# Patient Record
Sex: MALE | Race: WHITE | NOT HISPANIC OR LATINO | URBAN - METROPOLITAN AREA
[De-identification: names, ages, dates, MRNs, and addresses within clinical notes are randomized per-mention and may not be internally consistent; named-entity substitution may affect disease eponyms.]

---

## 2019-04-16 ENCOUNTER — INPATIENT (INPATIENT)
Facility: HOSPITAL | Age: 54
LOS: 0 days | Discharge: HOME | End: 2019-04-17
Attending: HOSPITALIST | Admitting: HOSPITALIST
Payer: COMMERCIAL

## 2019-04-16 VITALS
HEART RATE: 70 BPM | SYSTOLIC BLOOD PRESSURE: 169 MMHG | TEMPERATURE: 96 F | RESPIRATION RATE: 20 BRPM | OXYGEN SATURATION: 97 % | DIASTOLIC BLOOD PRESSURE: 98 MMHG

## 2019-04-16 LAB
ALBUMIN SERPL ELPH-MCNC: 4.6 G/DL — SIGNIFICANT CHANGE UP (ref 3.5–5.2)
ALP SERPL-CCNC: 63 U/L — SIGNIFICANT CHANGE UP (ref 30–115)
ALT FLD-CCNC: 22 U/L — SIGNIFICANT CHANGE UP (ref 0–41)
ANION GAP SERPL CALC-SCNC: 12 MMOL/L — SIGNIFICANT CHANGE UP (ref 7–14)
AST SERPL-CCNC: 18 U/L — SIGNIFICANT CHANGE UP (ref 0–41)
BASOPHILS # BLD AUTO: 0.05 K/UL — SIGNIFICANT CHANGE UP (ref 0–0.2)
BASOPHILS NFR BLD AUTO: 0.4 % — SIGNIFICANT CHANGE UP (ref 0–1)
BILIRUB SERPL-MCNC: 0.5 MG/DL — SIGNIFICANT CHANGE UP (ref 0.2–1.2)
BUN SERPL-MCNC: 13 MG/DL — SIGNIFICANT CHANGE UP (ref 10–20)
CALCIUM SERPL-MCNC: 9.8 MG/DL — SIGNIFICANT CHANGE UP (ref 8.5–10.1)
CHLORIDE SERPL-SCNC: 102 MMOL/L — SIGNIFICANT CHANGE UP (ref 98–110)
CO2 SERPL-SCNC: 27 MMOL/L — SIGNIFICANT CHANGE UP (ref 17–32)
CREAT SERPL-MCNC: 1 MG/DL — SIGNIFICANT CHANGE UP (ref 0.7–1.5)
EOSINOPHIL # BLD AUTO: 0.14 K/UL — SIGNIFICANT CHANGE UP (ref 0–0.7)
EOSINOPHIL NFR BLD AUTO: 1.2 % — SIGNIFICANT CHANGE UP (ref 0–8)
GLUCOSE SERPL-MCNC: 118 MG/DL — HIGH (ref 70–99)
HCT VFR BLD CALC: 49.9 % — SIGNIFICANT CHANGE UP (ref 42–52)
HGB BLD-MCNC: 17 G/DL — SIGNIFICANT CHANGE UP (ref 14–18)
IMM GRANULOCYTES NFR BLD AUTO: 0.8 % — HIGH (ref 0.1–0.3)
LACTATE SERPL-SCNC: 0.9 MMOL/L — SIGNIFICANT CHANGE UP (ref 0.5–2.2)
LYMPHOCYTES # BLD AUTO: 1.27 K/UL — SIGNIFICANT CHANGE UP (ref 1.2–3.4)
LYMPHOCYTES # BLD AUTO: 10.7 % — LOW (ref 20.5–51.1)
MCHC RBC-ENTMCNC: 30.5 PG — SIGNIFICANT CHANGE UP (ref 27–31)
MCHC RBC-ENTMCNC: 34.1 G/DL — SIGNIFICANT CHANGE UP (ref 32–37)
MCV RBC AUTO: 89.6 FL — SIGNIFICANT CHANGE UP (ref 80–94)
MONOCYTES # BLD AUTO: 1.16 K/UL — HIGH (ref 0.1–0.6)
MONOCYTES NFR BLD AUTO: 9.8 % — HIGH (ref 1.7–9.3)
NEUTROPHILS # BLD AUTO: 9.12 K/UL — HIGH (ref 1.4–6.5)
NEUTROPHILS NFR BLD AUTO: 77.1 % — HIGH (ref 42.2–75.2)
NRBC # BLD: 0 /100 WBCS — SIGNIFICANT CHANGE UP (ref 0–0)
PLATELET # BLD AUTO: 254 K/UL — SIGNIFICANT CHANGE UP (ref 130–400)
POTASSIUM SERPL-MCNC: 4.4 MMOL/L — SIGNIFICANT CHANGE UP (ref 3.5–5)
POTASSIUM SERPL-SCNC: 4.4 MMOL/L — SIGNIFICANT CHANGE UP (ref 3.5–5)
PROT SERPL-MCNC: 7.8 G/DL — SIGNIFICANT CHANGE UP (ref 6–8)
RBC # BLD: 5.57 M/UL — SIGNIFICANT CHANGE UP (ref 4.7–6.1)
RBC # FLD: 13 % — SIGNIFICANT CHANGE UP (ref 11.5–14.5)
SODIUM SERPL-SCNC: 141 MMOL/L — SIGNIFICANT CHANGE UP (ref 135–146)
WBC # BLD: 11.83 K/UL — HIGH (ref 4.8–10.8)
WBC # FLD AUTO: 11.83 K/UL — HIGH (ref 4.8–10.8)

## 2019-04-16 PROCEDURE — 74177 CT ABD & PELVIS W/CONTRAST: CPT | Mod: 26

## 2019-04-16 PROCEDURE — 99285 EMERGENCY DEPT VISIT HI MDM: CPT | Mod: 25

## 2019-04-16 RX ORDER — ACETAMINOPHEN 500 MG
650 TABLET ORAL EVERY 6 HOURS
Qty: 0 | Refills: 0 | Status: DISCONTINUED | OUTPATIENT
Start: 2019-04-16 | End: 2019-04-17

## 2019-04-16 RX ORDER — SODIUM CHLORIDE 9 MG/ML
1000 INJECTION INTRAMUSCULAR; INTRAVENOUS; SUBCUTANEOUS
Qty: 0 | Refills: 0 | Status: DISCONTINUED | OUTPATIENT
Start: 2019-04-16 | End: 2019-04-17

## 2019-04-16 RX ORDER — METRONIDAZOLE 500 MG
500 TABLET ORAL ONCE
Qty: 0 | Refills: 0 | Status: COMPLETED | OUTPATIENT
Start: 2019-04-16 | End: 2019-04-16

## 2019-04-16 RX ORDER — SODIUM CHLORIDE 9 MG/ML
1000 INJECTION, SOLUTION INTRAVENOUS ONCE
Qty: 0 | Refills: 0 | Status: COMPLETED | OUTPATIENT
Start: 2019-04-16 | End: 2019-04-16

## 2019-04-16 RX ORDER — METRONIDAZOLE 500 MG
500 TABLET ORAL EVERY 8 HOURS
Qty: 0 | Refills: 0 | Status: DISCONTINUED | OUTPATIENT
Start: 2019-04-16 | End: 2019-04-17

## 2019-04-16 RX ORDER — LABETALOL HCL 100 MG
200 TABLET ORAL ONCE
Qty: 0 | Refills: 0 | Status: COMPLETED | OUTPATIENT
Start: 2019-04-16 | End: 2019-04-16

## 2019-04-16 RX ORDER — CIPROFLOXACIN LACTATE 400MG/40ML
400 VIAL (ML) INTRAVENOUS EVERY 12 HOURS
Qty: 0 | Refills: 0 | Status: DISCONTINUED | OUTPATIENT
Start: 2019-04-16 | End: 2019-04-17

## 2019-04-16 RX ORDER — KETOROLAC TROMETHAMINE 30 MG/ML
15 SYRINGE (ML) INJECTION ONCE
Qty: 0 | Refills: 0 | Status: DISCONTINUED | OUTPATIENT
Start: 2019-04-16 | End: 2019-04-16

## 2019-04-16 RX ORDER — CHLORHEXIDINE GLUCONATE 213 G/1000ML
1 SOLUTION TOPICAL
Qty: 0 | Refills: 0 | Status: DISCONTINUED | OUTPATIENT
Start: 2019-04-16 | End: 2019-04-17

## 2019-04-16 RX ORDER — TRAMADOL HYDROCHLORIDE 50 MG/1
50 TABLET ORAL EVERY 4 HOURS
Qty: 0 | Refills: 0 | Status: DISCONTINUED | OUTPATIENT
Start: 2019-04-16 | End: 2019-04-17

## 2019-04-16 RX ADMIN — Medication 200 MILLIGRAM(S): at 22:04

## 2019-04-16 RX ADMIN — TRAMADOL HYDROCHLORIDE 50 MILLIGRAM(S): 50 TABLET ORAL at 22:48

## 2019-04-16 RX ADMIN — TRAMADOL HYDROCHLORIDE 50 MILLIGRAM(S): 50 TABLET ORAL at 22:00

## 2019-04-16 RX ADMIN — Medication 100 MILLIGRAM(S): at 21:05

## 2019-04-16 RX ADMIN — Medication 200 MILLIGRAM(S): at 13:51

## 2019-04-16 RX ADMIN — Medication 15 MILLIGRAM(S): at 08:02

## 2019-04-16 RX ADMIN — Medication 100 MILLIGRAM(S): at 12:58

## 2019-04-16 RX ADMIN — SODIUM CHLORIDE 2000 MILLILITER(S): 9 INJECTION, SOLUTION INTRAVENOUS at 08:02

## 2019-04-16 RX ADMIN — Medication 15 MILLIGRAM(S): at 17:10

## 2019-04-16 RX ADMIN — SODIUM CHLORIDE 75 MILLILITER(S): 9 INJECTION INTRAMUSCULAR; INTRAVENOUS; SUBCUTANEOUS at 17:10

## 2019-04-16 NOTE — H&P ADULT - NSHPLABSRESULTS_GEN_ALL_CORE
17.0   11.83 )-----------( 254      ( 16 Apr 2019 07:17 )             49.9       04-16    141  |  102  |  13  ----------------------------<  118<H>  4.4   |  27  |  1.0    Ca    9.8      16 Apr 2019 07:17    TPro  7.8  /  Alb  4.6  /  TBili  0.5  /  DBili  x   /  AST  18  /  ALT  22  /  AlkPhos  63  04-16    < from: CT Abdomen and Pelvis w/ IV Cont (04.16.19 @ 09:21) >    EXAM:  CT ABDOMEN AND PELVIS IC            PROCEDURE DATE:  04/16/2019            INTERPRETATION:  CLINICAL STATEMENT: Left lower quadrant pain.      TECHNIQUE: Contiguous axial CT images were obtained from the lower chest   to the pubic symphysis following administration of 100cc Optiray 320   intravenous contrast.  Oral contrast was not administered.  Reformatted   images in the coronal and sagittal planes were acquired.    COMPARISON CT: 1/21/2016    OTHER STUDIES USED FOR CORRELATION: None.      FINDINGS:    LOWER CHEST: Unremarkable.    HEPATOBILIARY: Unremarkable.    SPLEEN: Unremarkable.    PANCREAS: Unremarkable.    ADRENAL GLANDS: Unremarkable.    KIDNEYS: Unremarkable.    ABDOMINOPELVIC NODES: Unremarkable.    PELVIC ORGANS: Diffuse urinary bladder wall thickening.    PERITONEUM/MESENTERY/BOWEL: Acute inflammation and phlegmon surrounding a   lower descending colonic diverticulum. No abscess. No free air. Normal   appendix. No bowel obstruction.    BONES/SOFT TISSUES: Degenerative changes of the hips and symphysis pubis.   No acute abnormalities.    OTHER:        IMPRESSION:     Acute lower descending colonic diverticulitis with surrounding phlegmon.   No abscess. No evidence of perforation< from: CT Abdomen and Pelvis w/ IV Cont (04.16.19 @ 09:21) > 17.0   11.83 )-----------( 254      ( 16 Apr 2019 07:17 )             49.9       04-16    141  |  102  |  13  ----------------------------<  118<H>  4.4   |  27  |  1.0    Ca    9.8      16 Apr 2019 07:17    TPro  7.8  /  Alb  4.6  /  TBili  0.5  /  DBili  x   /  AST  18  /  ALT  22  /  AlkPhos  63  04-16    < from: CT Abdomen and Pelvis w/ IV Cont (04.16.19 @ 09:21) >    EXAM:  CT ABDOMEN AND PELVIS IC            PROCEDURE DATE:  04/16/2019            INTERPRETATION:  CLINICAL STATEMENT: Left lower quadrant pain.      TECHNIQUE: Contiguous axial CT images were obtained from the lower chest   to the pubic symphysis following administration of 100cc Optiray 320   intravenous contrast.  Oral contrast was not administered.  Reformatted   images in the coronal and sagittal planes were acquired.    COMPARISON CT: 1/21/2016    OTHER STUDIES USED FOR CORRELATION: None.      FINDINGS:    LOWER CHEST: Unremarkable.    HEPATOBILIARY: Unremarkable.    SPLEEN: Unremarkable.    PANCREAS: Unremarkable.    ADRENAL GLANDS: Unremarkable.    KIDNEYS: Unremarkable.    ABDOMINOPELVIC NODES: Unremarkable.    PELVIC ORGANS: Diffuse urinary bladder wall thickening.    PERITONEUM/MESENTERY/BOWEL: Acute inflammation and phlegmon surrounding a   lower descending colonic diverticulum. No abscess. No free air. Normal   appendix. No bowel obstruction.    BONES/SOFT TISSUES: Degenerative changes of the hips and symphysis pubis.   No acute abnormalities.    OTHER:        IMPRESSION:     Acute lower descending colonic diverticulitis with surrounding phlegmon.   No abscess. No evidence of perforation

## 2019-04-16 NOTE — ED ADULT NURSE NOTE - NSIMPLEMENTINTERV_GEN_ALL_ED
Implemented All Universal Safety Interventions:  [place] to call system. Call bell, personal items and telephone within reach. Instruct patient to call for assistance. Room bathroom lighting operational. Non-slip footwear when patient is off stretcher. Physically safe environment: no spills, clutter or unnecessary equipment. Stretcher in lowest position, wheels locked, appropriate side rails in place.

## 2019-04-16 NOTE — ED PROVIDER NOTE - PHYSICAL EXAMINATION
Constitutional: Well developed, well nourished. NAD. Good general hygiene. Obese.   Head: Atraumatic.  Eyes: EOMI without discomfort.   ENT: No nasal discharge. Mucous membranes moist.  Neck: Supple. Painless ROM.  Cardiovascular: Regular rhythm. Regular rate. Normal S1 and S2. No murmurs. 2+ pulses in all extremities.   Pulmonary: Normal respiratory rate and effort. Lungs clear to auscultation bilaterally. No wheezing, rales, or rhonchi. Bilateral, equal lung expansion.   Abdominal: Soft. Nondistended. LLQ tenderness. No rebound or guarding.   Extremities: Pelvis stable. No lower extremity edema. Symmetric calves.  Skin: No rashes.   Neuro: AAOx3. No focal neurological deficits.  Psych: Normal mood. Normal affect.

## 2019-04-16 NOTE — H&P ADULT - ASSESSMENT
#)              #)Diet:   #)Activity:   #)DVT ppx:  #)GI ppx:  #)Dispo:   #)Code: 54 year old male with no significant PMH presented to the hospital with chief complaint of LLQ abdominal pain for 2 days found to have diverticulitis on imaging.    #)Acute uncomplicated diverticulitis(No evidence of perforation, obstruction, abscess on imaging)  -Ct showed Acute lower descending colonic diverticulitis with surrounding phlegmon.   -No evidence of sepsis at the time of admission  -c/w cipro  Q12, Flagyl IV 500Q8  -NPO for now  -IV fluids  -Surgery consult given phlegmon on imaging  -Pain control   -If pain is improving no intervention from surgery can start on clear liquids    #)BP was high at the time of admission likely due to pain and anxiety, will recheck the blood pressure if still high can start on medication    #)Diet: NPO for now   #)Activity: Ambulate with assistance  #)DVT ppx: Low risk of DVT, fully functional mobile  #)GI ppx: Not inidcated  #)Dispo: From home  #)Code: Full 54 year old male with no significant PMH presented to the hospital with chief complaint of LLQ abdominal pain for 2 days found to have diverticulitis on imaging.    #)Acute uncomplicated diverticulitis(No evidence of perforation, obstruction, abscess on imaging)  -Ct showed Acute lower descending colonic diverticulitis with surrounding phlegmon.   -No evidence of sepsis at the time of admission  -c/w cipro  Q12, Flagyl IV 500Q8  -NPO for now  -IV fluids  -Surgery consult given phlegmon on imaging, spoke with resident over the phone  -Pain control   -If pain is improving no intervention from surgery can start on clear liquids    #)BP was high at the time of admission likely due to pain and anxiety, will recheck the blood pressure if still high can start on medication    #)Diet: NPO for now   #)Activity: Ambulate with assistance  #)DVT ppx: Low risk of DVT, fully functional mobile  #)GI ppx: Not inidcated  #)Dispo: From home  #)Code: Full

## 2019-04-16 NOTE — ED PROVIDER NOTE - OBJECTIVE STATEMENT
54y M wo sig PMH presents for LLQ abd pn for the last 3 days, constant. No modifying factors. Nonradiating. Not associated with n/v/d. Pt had been constipated for a few days, but took milk of magnesia yesterday and had multiple normal BMs. Did not change the pain.   Had colonoscopy 5 years ago. No fevers or chills. No hx of abdominal surgeries. Never had similar symptoms in the past.

## 2019-04-16 NOTE — ED PROVIDER NOTE - NS ED ROS FT
Constitutional: No fever or chills. Decreased appetite. No unintended weight loss.   Eyes: No vision changes.  ENT: No hearing changes. No ear pain. No sore throat.  Neck: No neck pain or stiffness.  Cardiovascular: No chest pain, palpitations, or edema.  Pulmonary: No cough or SOB. No hemoptysis.  Abdominal: No nausea, vomiting, or diarrhea.   : No dysuria or frequency. No hematuria.   Neuro: No headache, syncope, or dizziness.  MS: No back pain. No calf pain/swelling.  Psych: No suicidal or homicidal ideations.

## 2019-04-16 NOTE — ED PROVIDER NOTE - ATTENDING CONTRIBUTION TO CARE
54 M to ED with abd pain LLQ x few days, pain over diffuse abd but incr in LLQ,   no h/o abd sx or colitis and has been otherwise well.    AVSS, exam as noted, CTAB, RRR, abdomen tender LLQ, (+) bowel sounds, neuro nonfocal

## 2019-04-16 NOTE — H&P ADULT - HISTORY OF PRESENT ILLNESS
54y M wo sig PMH presents for LLQ abd pn for the last 3 days, constant. No modifying factors. Non radiating. Not associated with n/v/d. Pt had been constipated for a few days, but took milk of magnesia yesterday and had multiple normal BMs. Did not change the pain.   Had colonoscopy 5 years ago. 54y M wo sig PMH presents for LLQ abd pn for the last 3 days, constant. No modifying factors. Non radiating. Not associated with n/v/d. Pt had been constipated for a few days, but took milk of magnesia yesterday and had multiple normal BMs. Did not change the pain.   Had colonoscopy 5 years ago.      Vitals in ED: BP: 169/98, 70HR, 96.3F, 20 RR< 97% on ro0om air, Labs showed Wbc count of 11.83, Hb of 17, Ct abd and pelvis with IV contrast showed Acute lower descending colonic diverticulitis with surrounding phlegmon. No abscess. No evidence of perforation 54 year old male with no significant PMH presented to the hospital with chief complaint of abdominal pain for 2 days. As per the patient abdominal pain started on sunday afternoon,  sudden in onset, left lower quadrant, pressure like, constant initially started as mild 2-3/10 then progressed to 6-7/10, initially he thought of gas pain and took milk of magnesia had  bowel movement yesterday but pain was not resolved, not aggravated with food intake, no relieving factors, not associated with nausea, vomiting, constipation, diarrhea, never had this  pain before. Denied any fever, chills, N/V/Constipation/diarrhea, headache, dizziness, acid reflux, bloating, belching, hematemesis, melena, hematochezia.  Today in the morning pain still continued which made him to come to ED.   He had endoscopy and colonoscopy in 2012 by Dr. Gilbert the reason he mentioned was for ?IBS (He had symptoms of reflux and abdominal pain at that time) as per him both  were normal. Never had any medical problems not taking any prescription and non prescription medications. No family history of any cancers in first degree relatives.    Vitals in ED: BP: 169/98, 70HR, 96.3F, 20 RR< 97% on room air, Labs showed Wbc count of 11.83, Hb of 17, Ct abd and pelvis with IV contrast showed Acute lower descending colonic diverticulitis with surrounding phlegmon. No abscess. No evidence of perforation, s/p cirpo and flagyl, 1 litre LR in ED. 54 year old male with no significant PMH presented to the hospital with chief complaint of abdominal pain for 2 days. As per the patient abdominal pain started on sunday afternoon,  sudden in onset, left lower quadrant, pressure like, constant initially started as mild 2-3/10 then progressed to 6-7/10, initially he thought of gas pain and took milk of magnesia had  bowel movement yesterday but pain was not resolved, not aggravated with food intake, no relieving factors, not associated with nausea, vomiting, constipation, diarrhea, never had this  pain before. Denied any fever, chills, N/V/Constipation/diarrhea, headache, dizziness, acid reflux, bloating, belching, hematemesis, melena, hematochezia.  Today in the morning pain still continued which made him to come to ED.   He had endoscopy and colonoscopy in 2012 by Dr. Gilbert the reason he mentioned was for ?IBS (He had symptoms of reflux and abdominal pain at that time) as per him both  were normal. Never had any medical problems not taking any prescription and non prescription medications. No family history of any cancers in first degree relatives. He works as a  involves heavy weight lifting.  Currently pain is resolved    Vitals in ED: BP: 169/98, 70HR, 96.3F, 20 RR< 97% on room air, Labs showed Wbc count of 11.83, Hb of 17, Ct abd and pelvis with IV contrast showed Acute lower descending colonic diverticulitis with surrounding phlegmon. No abscess. No evidence of perforation, s/p cirpo and flagyl, 1 litre LR in ED.

## 2019-04-16 NOTE — ED ADULT NURSE NOTE - OBJECTIVE STATEMENT
Patient awake, alert, oriented x3 sitting upright in stretcher in no acute distress (no SOB, non-diaphoretic), breathing/conversing w/ease on RA. Seeks medical attention for pain to LLQ starting last night. Took stool softner last night and passed stool well. Denies fever. Wife Acacia @bedside. Will continue to monitor/assess while awaiting radiology imaging.

## 2019-04-16 NOTE — ED PROVIDER NOTE - CARE PLAN
Principal Discharge DX:	Diverticulitis of large intestine without bleeding, unspecified complication status

## 2019-04-16 NOTE — H&P ADULT - NSHPSOCIALHISTORY_GEN_ALL_CORE
never a smoker, alcoholic, no illicit drug abuse never a smoker, alcoholic, no illicit drug abuse, He works as a  involves heavy weight lifting.

## 2019-04-16 NOTE — H&P ADULT - NSHPPHYSICALEXAM_GEN_ALL_CORE
ICU Vital Signs Last 24 Hrs  T(C): 35.7 (16 Apr 2019 06:03), Max: 35.7 (16 Apr 2019 06:03)  T(F): 96.3 (16 Apr 2019 06:03), Max: 96.3 (16 Apr 2019 06:03)  HR: 70 (16 Apr 2019 06:03) (70 - 70)  BP: 169/98 (16 Apr 2019 06:03) (169/98 - 169/98)  BP(mean): --  ABP: --  ABP(mean): --  RR: 20 (16 Apr 2019 06:03) (20 - 20)  SpO2: 97% (16 Apr 2019 06:03) (97% - 97%)    PHYSICAL EXAM:  GENERAL: NAD, speaks in full sentences, no signs of respiratory distress  HEAD:  Atraumatic, Normocephalic  EYES: EOMI, PERRLA, conjunctiva and sclera clear  NECK: Supple, No JVD  CHEST/LUNG: Clear to auscultation bilaterally; No wheeze; No crackles; No accessory muscles used  HEART: Regular rate and rhythm; No murmurs;   ABDOMEN: Soft, Nontender, Nondistended; Bowel sounds present; No guarding  EXTREMITIES:  2+ Peripheral Pulses, No cyanosis or edema  PSYCH: AAOx3  NEUROLOGY: non-focal  SKIN: No rashes or lesions ICU Vital Signs Last 24 Hrs  T(C): 35.7 (16 Apr 2019 06:03), Max: 35.7 (16 Apr 2019 06:03)  T(F): 96.3 (16 Apr 2019 06:03), Max: 96.3 (16 Apr 2019 06:03)  HR: 70 (16 Apr 2019 06:03) (70 - 70)  BP: 169/98 (16 Apr 2019 06:03) (169/98 - 169/98)  BP(mean): --  ABP: --  ABP(mean): --  RR: 20 (16 Apr 2019 06:03) (20 - 20)  SpO2: 97% (16 Apr 2019 06:03) (97% - 97%)    PHYSICAL EXAM:  GENERAL: NAD, speaks in full sentences, no signs of respiratory distress  HEAD:  Atraumatic, Normocephalic  EYES: EOMI, PERRLA, conjunctiva and sclera clear  NECK: Supple, No JVD  CHEST/LUNG: Clear to auscultation bilaterally; No wheeze; No crackles; No accessory muscles used  HEART: Regular rate and rhythm; No murmurs;   ABDOMEN: Soft, Tenderness in LLQ, Nondistended; Bowel sounds present; No guarding  EXTREMITIES:  2+ Peripheral Pulses, No cyanosis or edema  PSYCH: AAOx3  NEUROLOGY: non-focal

## 2019-04-16 NOTE — H&P ADULT - ATTENDING COMMENTS
HPI as above.  Vital Signs (24 Hrs):  T(C): 36.5 (04-16-19 @ 15:49), Max: 36.5 (04-16-19 @ 15:49)  HR: 85 (04-16-19 @ 15:49) (70 - 85)  BP: 145/104 (04-16-19 @ 15:49) (145/104 - 169/98)  RR: 17 (04-16-19 @ 15:49) (17 - 20)  SpO2: 97% (04-16-19 @ 15:49) (97% - 97%)  Wt(kg): --  Daily     Daily     I&O's Summary    PHYSICAL EXAM:  GENERAL: NAD, well-developed  HEAD:  Atraumatic, Normocephalic  CHEST/LUNG: Clear to auscultation bilaterally; No wheeze  HEART: Regular rate and rhythm; No murmurs, rubs, or gallops  ABDOMEN: LLQ tenderness, +BS   EXTREMITIES:  2+ Peripheral Pulses, No clubbing, cyanosis, or edema  PSYCH: AAOx3  NEUROLOGY: non-focal  SKIN: No rashes or lesions    Labs reviewed  Imaging reviewed- Acute lower descending colonic diverticulitis with surrounding phlegmon.   No abscess. No evidence of perforation    < end of copied text >    Plan  #Diverticultitis with phlegmon- neutrophil shift- mild wbc-fu surgery, pt pain after eating continue NPO for now, attempt to start clears in am, likely transition to po in am   #BP- fu recheck, pt in pain    #Progress Note Handoff  Pending (specify):  Consults____surgery_____, Tests________, Test Results_______, Other_________  Family discussion: dw pt at bedside and agreed with plan   Disposition: Home_x__/SNF___/Other________/Unknown at this time________

## 2019-04-17 ENCOUNTER — TRANSCRIPTION ENCOUNTER (OUTPATIENT)
Age: 54
End: 2019-04-17

## 2019-04-17 VITALS
RESPIRATION RATE: 16 BRPM | DIASTOLIC BLOOD PRESSURE: 85 MMHG | HEART RATE: 100 BPM | TEMPERATURE: 99 F | SYSTOLIC BLOOD PRESSURE: 148 MMHG

## 2019-04-17 LAB
ANION GAP SERPL CALC-SCNC: 11 MMOL/L — SIGNIFICANT CHANGE UP (ref 7–14)
BASOPHILS # BLD AUTO: 0.03 K/UL — SIGNIFICANT CHANGE UP (ref 0–0.2)
BASOPHILS NFR BLD AUTO: 0.3 % — SIGNIFICANT CHANGE UP (ref 0–1)
BUN SERPL-MCNC: 8 MG/DL — LOW (ref 10–20)
CALCIUM SERPL-MCNC: 9 MG/DL — SIGNIFICANT CHANGE UP (ref 8.5–10.1)
CHLORIDE SERPL-SCNC: 103 MMOL/L — SIGNIFICANT CHANGE UP (ref 98–110)
CO2 SERPL-SCNC: 27 MMOL/L — SIGNIFICANT CHANGE UP (ref 17–32)
CREAT SERPL-MCNC: 1.2 MG/DL — SIGNIFICANT CHANGE UP (ref 0.7–1.5)
EOSINOPHIL # BLD AUTO: 0.1 K/UL — SIGNIFICANT CHANGE UP (ref 0–0.7)
EOSINOPHIL NFR BLD AUTO: 1.1 % — SIGNIFICANT CHANGE UP (ref 0–8)
GLUCOSE SERPL-MCNC: 111 MG/DL — HIGH (ref 70–99)
HCT VFR BLD CALC: 45.3 % — SIGNIFICANT CHANGE UP (ref 42–52)
HGB BLD-MCNC: 15 G/DL — SIGNIFICANT CHANGE UP (ref 14–18)
IMM GRANULOCYTES NFR BLD AUTO: 0.7 % — HIGH (ref 0.1–0.3)
LYMPHOCYTES # BLD AUTO: 1.08 K/UL — LOW (ref 1.2–3.4)
LYMPHOCYTES # BLD AUTO: 11.5 % — LOW (ref 20.5–51.1)
MCHC RBC-ENTMCNC: 29.9 PG — SIGNIFICANT CHANGE UP (ref 27–31)
MCHC RBC-ENTMCNC: 33.1 G/DL — SIGNIFICANT CHANGE UP (ref 32–37)
MCV RBC AUTO: 90.4 FL — SIGNIFICANT CHANGE UP (ref 80–94)
MONOCYTES # BLD AUTO: 1.13 K/UL — HIGH (ref 0.1–0.6)
MONOCYTES NFR BLD AUTO: 12 % — HIGH (ref 1.7–9.3)
NEUTROPHILS # BLD AUTO: 6.99 K/UL — HIGH (ref 1.4–6.5)
NEUTROPHILS NFR BLD AUTO: 74.4 % — SIGNIFICANT CHANGE UP (ref 42.2–75.2)
NRBC # BLD: 0 /100 WBCS — SIGNIFICANT CHANGE UP (ref 0–0)
PLATELET # BLD AUTO: 192 K/UL — SIGNIFICANT CHANGE UP (ref 130–400)
POTASSIUM SERPL-MCNC: 5 MMOL/L — SIGNIFICANT CHANGE UP (ref 3.5–5)
POTASSIUM SERPL-SCNC: 5 MMOL/L — SIGNIFICANT CHANGE UP (ref 3.5–5)
RBC # BLD: 5.01 M/UL — SIGNIFICANT CHANGE UP (ref 4.7–6.1)
RBC # FLD: 13 % — SIGNIFICANT CHANGE UP (ref 11.5–14.5)
SODIUM SERPL-SCNC: 141 MMOL/L — SIGNIFICANT CHANGE UP (ref 135–146)
WBC # BLD: 9.4 K/UL — SIGNIFICANT CHANGE UP (ref 4.8–10.8)
WBC # FLD AUTO: 9.4 K/UL — SIGNIFICANT CHANGE UP (ref 4.8–10.8)

## 2019-04-17 PROCEDURE — 99223 1ST HOSP IP/OBS HIGH 75: CPT

## 2019-04-17 RX ORDER — METRONIDAZOLE 500 MG
1 TABLET ORAL
Qty: 39 | Refills: 0 | OUTPATIENT
Start: 2019-04-17 | End: 2019-04-29

## 2019-04-17 RX ORDER — LACTOBACILLUS ACIDOPHILUS 100MM CELL
1 CAPSULE ORAL
Qty: 39 | Refills: 0 | OUTPATIENT
Start: 2019-04-17 | End: 2019-04-29

## 2019-04-17 RX ORDER — LACTOBACILLUS ACIDOPHILUS 100MM CELL
1 CAPSULE ORAL
Qty: 0 | Refills: 0 | Status: DISCONTINUED | OUTPATIENT
Start: 2019-04-17 | End: 2019-04-17

## 2019-04-17 RX ORDER — CIPROFLOXACIN LACTATE 400MG/40ML
1 VIAL (ML) INTRAVENOUS
Qty: 26 | Refills: 0 | OUTPATIENT
Start: 2019-04-17 | End: 2019-04-29

## 2019-04-17 RX ADMIN — Medication 1 TABLET(S): at 12:45

## 2019-04-17 RX ADMIN — Medication 200 MILLIGRAM(S): at 02:25

## 2019-04-17 RX ADMIN — TRAMADOL HYDROCHLORIDE 50 MILLIGRAM(S): 50 TABLET ORAL at 03:35

## 2019-04-17 RX ADMIN — Medication 100 MILLIGRAM(S): at 13:05

## 2019-04-17 RX ADMIN — Medication 100 MILLIGRAM(S): at 05:17

## 2019-04-17 RX ADMIN — Medication 200 MILLIGRAM(S): at 14:11

## 2019-04-17 RX ADMIN — TRAMADOL HYDROCHLORIDE 50 MILLIGRAM(S): 50 TABLET ORAL at 03:52

## 2019-04-17 RX ADMIN — Medication 1 TABLET(S): at 18:04

## 2019-04-17 NOTE — DISCHARGE NOTE NURSING/CASE MANAGEMENT/SOCIAL WORK - NSDCDPATPORTLINK_GEN_ALL_CORE
You can access the [facility] Portal, offered by [facility], by registering with the following website: [email or web address]

## 2019-04-17 NOTE — DISCHARGE NOTE PROVIDER - NSDCCPCAREPLAN_GEN_ALL_CORE_FT
PRINCIPAL DISCHARGE DIAGNOSIS  Diagnosis: Diverticulitis of large intestine without bleeding, unspecified complication status  Assessment and Plan of Treatment: You were found to have inflammation of an outpouching of your colon and received IV fluids and antibiotics in the hospital. Take your full course of oral antibiotics when you leave, drink plenty of fluids, and eat a low fiber, low residue, low fat diet for the next two weeks. Follow up with your primary care doctor in 2 weeks and GI doctor in 1 month.

## 2019-04-17 NOTE — CONSULT NOTE ADULT - SUBJECTIVE AND OBJECTIVE BOX
GENERAL SURGERY CONSULT NOTE    Patient: RJ AMAYA , 54y ([birth year])Male   MRN: [id number]  Location: [facility] [id number] B  Visit: 04-16-19 Inpatient  Date: 04-17-19 @ 16:42    HPI:  54M w/ no significant PMH presented to the hospital with chief complaint of abdominal pain for 2 days. As per the patient abdominal pain started on sunday afternoon,  sudden in onset, left lower quadrant, pressure like, constant initially started as mild 2-3/10 then progressed to 6-7/10, initially he thought of gas pain and took milk of magnesia had  bowel movement yesterday but pain was not resolved, not aggravated with food intake, no relieving factors, not associated with nausea, vomiting, constipation, diarrhea, never had this  pain before. Denied any fever, chills, N/V/Constipation/diarrhea, headache, dizziness, acid reflux, bloating, belching, hematemesis, melena, hematochezia.Today in the morning pain still continued which made him to come to ED. He had endoscopy and colonoscopy in 2012 by Dr. Gilbert the reason he mentioned was for ?IBS (He had symptoms of reflux and abdominal pain at that time) as per him both  were normal. Never had any medical problems not taking any prescription and non prescription medications. No family history of any cancers in first degree relatives. He works as a  involves heavy weight lifting.    Vitals in ED: BP: 169/98, 70HR, 96.3F, 20 RR< 97% on room air, Labs showed Wbc count of 11.83, Hb of 17, Ct abd and pelvis with IV contrast showed Acute lower descending colonic diverticulitis with surrounding phlegmon. No abscess. No evidence of perforation, s/p cirpo and flagyl, 1 litre LR in ED.    PAST MEDICAL & SURGICAL HISTORY:  Kidney stone: Last time he had it dissolved on its own    VITALS:  T(F): 99.4 (04-17-19 @ 14:04), Max: 99.4 (04-17-19 @ 14:04)  HR: 100 (04-17-19 @ 14:04) (70 - 100)  BP: 148/85 (04-17-19 @ 14:04) (126/79 - 187/104)  RR: 16 (04-17-19 @ 14:04) (16 - 18)  SpO2: 96% (04-16-19 @ 21:54) (96% - 96%)    PHYSICAL EXAM:  General: NAD, AAOx3, calm and cooperative  Cardiac: RRR   Respiratory: CTAB  Abdomen: Soft, non-distended, Mild LLQ tenderness, no rebound, no guarding. +BS.    MEDICATIONS  (STANDING):  chlorhexidine 4% Liquid 1 Application(s) Topical <User Schedule>  ciprofloxacin   IVPB 400 milliGRAM(s) IV Intermittent every 12 hours  lactobacillus acidophilus 1 Tablet(s) Oral three times a day with meals  metroNIDAZOLE  IVPB 500 milliGRAM(s) IV Intermittent every 8 hours  sodium chloride 0.9%. 1000 milliLiter(s) (75 mL/Hr) IV Continuous <Continuous>    MEDICATIONS  (PRN):  acetaminophen   Tablet .. 650 milliGRAM(s) Oral every 6 hours PRN Mild Pain (1 - 3)  traMADol 50 milliGRAM(s) Oral every 4 hours PRN Moderate Pain (4 - 6)    LAB/STUDIES:                        15.0   9.40  )-----------( 192      ( 17 Apr 2019 08:40 )             45.3     04-17    141  |  103  |  8<L>  ----------------------------<  111<H>  5.0   |  27  |  1.2    Ca    9.0      17 Apr 2019 08:40    TPro  7.8  /  Alb  4.6  /  TBili  0.5  /  DBili  x   /  AST  18  /  ALT  22  /  AlkPhos  63  04-16    LIVER FUNCTIONS - ( 16 Apr 2019 07:17 )  Alb: 4.6 g/dL / Pro: 7.8 g/dL / ALK PHOS: 63 U/L / ALT: 22 U/L / AST: 18 U/L / GGT: x           IMAGING:  CT Abdomen and Pelvis w/ IV Cont:     IMPRESSION:     Acute lower descending colonic diverticulitis with surrounding phlegmon.   No abscess. No evidence of perforation    ASSESSMENT:  54M w/ no significant PMHx who presented with LLQ abdominal pain for 2 days found to have Acute uncomplicated diverticulitis. patients 1 episode of diverticulitis, Last colonoscopy in 2012 by Dr. Ofelia du.     PLAN:  - No need for any surgical intervention, patients 1 episode of uncomplicated diverticulitis.    - Advance diet, once tolerating and pain is resolved, may discharge home with Cipro/Flagyl  - Needs repeat Colonoscopy in 4-6 weeks.    Above plan discussed with Dr. Sandoval

## 2019-04-17 NOTE — CONSULT NOTE ADULT - ATTENDING COMMENTS
acute recurrent uncomplicated diverticulitis   advance diet d/c home on 10 days of antibiotics   f/u in office

## 2019-04-17 NOTE — PROGRESS NOTE ADULT - ASSESSMENT
54 year old male with no significant PMH presented to the hospital with chief complaint of abdominal pain for 2 days was found to have diverticulitis treated with IV ABx, kept NPO, clinically improved.      A/P    # Acute diverticulitis/ Leukocytosis  - clinically improved   - WBC WNL today  - pureed diet for lunch, advance as tolerated  - c/w Cipro and Flagyl for 14 days total  - start Probiotics  - low fiber diet for next 3-5 days  - pt needs GI follow up after discharge for colonoscopy in 6-8 weeks from now.   - pt was consulted regarding diet, medications compliance, follow up after discharge    #Progress Note Handoff  Pending (specify):  advance diet as tolerated   Family discussion: I spoke with wife by the bedside at length   Disposition: discharge home today if pt'll tolerate diet

## 2019-04-17 NOTE — PROGRESS NOTE ADULT - SUBJECTIVE AND OBJECTIVE BOX
Patient is a 54y old  Male who presents with a chief complaint of Abdominal pain, was found to have diverticulitis treated with IV ABx, kept NPO, clinically improved.  Today pt denied abdominal pain, has light discomfort in LLQ.       Vital Signs Last 24 Hrs  T(C): 37.4 (17 Apr 2019 14:04), Max: 37.4 (17 Apr 2019 14:04)  T(F): 99.4 (17 Apr 2019 14:04), Max: 99.4 (17 Apr 2019 14:04)  HR: 100 (17 Apr 2019 14:04) (70 - 100)  BP: 148/85 (17 Apr 2019 14:04) (126/79 - 187/104)  RR: 16 (17 Apr 2019 14:04) (16 - 18)  SpO2: 96% (16 Apr 2019 21:54) (96% - 96%)    PHYSICAL EXAM:  GENERAL: NAD, well-groomed, well-developed  HEAD:  Atraumatic, Normocephalic  NECK: Supple, No JVD, Normal thyroid  NERVOUS SYSTEM:  Alert & Oriented X3, Good concentration; Motor Strength 5/5 B/L upper and lower extremities; DTRs 2+ intact and symmetric  CHEST/LUNG: Clear to percussion bilaterally; No rales, rhonchi, wheezing, or rubs  HEART: Regular rate and rhythm; No murmurs, rubs, or gallops  ABDOMEN: Soft, slightly tender in LLQ, Nondistended; Bowel sounds present, no rebound, no guarding   EXTREMITIES:  2+ Peripheral Pulses, No clubbing, cyanosis, or edema  LYMPH: No lymphadenopathy noted  SKIN: No rashes or lesions      LABS:                        15.0   9.40  )-----------( 192      ( 17 Apr 2019 08:40 )             45.3     04-17    141  |  103  |  8<L>  ----------------------------<  111<H>  5.0   |  27  |  1.2    Ca    9.0      17 Apr 2019 08:40    TPro  7.8  /  Alb  4.6  /  TBili  0.5  /  DBili  x   /  AST  18  /  ALT  22  /  AlkPhos  63  04-16    RADIOLOGY & ADDITIONAL TESTS:  < from: CT Abdomen and Pelvis w/ IV Cont (04.16.19 @ 09:21) >  IMPRESSION:     Acute lower descending colonic diverticulitis with surrounding phlegmon.   No abscess. No evidence of perforation    MEDICATIONS  (STANDING):  chlorhexidine 4% Liquid 1 Application(s) Topical <User Schedule>  ciprofloxacin   IVPB 400 milliGRAM(s) IV Intermittent every 12 hours  lactobacillus acidophilus 1 Tablet(s) Oral three times a day with meals  metroNIDAZOLE  IVPB 500 milliGRAM(s) IV Intermittent every 8 hours  sodium chloride 0.9%. 1000 milliLiter(s) (75 mL/Hr) IV Continuous <Continuous>    MEDICATIONS  (PRN):  acetaminophen   Tablet .. 650 milliGRAM(s) Oral every 6 hours PRN Mild Pain (1 - 3)  traMADol 50 milliGRAM(s) Oral every 4 hours PRN Moderate Pain (4 - 6)

## 2019-04-17 NOTE — DISCHARGE NOTE PROVIDER - HOSPITAL COURSE
54 year old male with no significant pmh presented to the hospital with chief complaint of abdominal pain for 2 days. As per the patient abdominal pain started on Sunday afternoon,    sudden in onset, left lower quadrant, pressure like, constant initially started as mild 2-3/10 then progressed to 6-7/10, initially he thought of gas pain and took milk of magnesia had    bowel movement the day prior to admission but pain was not resolved. Not aggravated with food intake, no relieving factors, not associated with nausea, vomiting, constipation, diarrhea, never had this    pain before. Denied any fever, chills, N/V/Constipation/diarrhea. In the morning pain still continued which made him to come to ED. He had endoscopy and colonoscopy in 2012 by Dr. Gilbert the reason he mentioned was for ?IBS (He had symptoms of reflux and abdominal pain at that time) as per him both were normal. CT abd/pelvis with IV contrast showed acute lower descending colonic diverticulitis with surrounding phlegmon. No abscess. No evidence of perforation. Started on IVF and cipro/flagyl with rapid improvement. Per surgery no indication for intervention. Doing markedly better the next day; diet advanced and switched to PO antibiotics. To be discharged to follow up with PCP and GI.

## 2019-04-17 NOTE — DISCHARGE NOTE PROVIDER - CARE PROVIDER_API CALL
Trung Mishra)  Geriatric Medicine; Internal Medicine  [address] [place] [address]  Phone: [phone]  Fax: [phone]  Follow Up Time: 2 weeks    Franko Rodriguez)  Gastroenterology  [address] [place] [address]  Phone: [phone]  Fax: [phone]  Follow Up Time: 1 month

## 2019-04-17 NOTE — DISCHARGE NOTE PROVIDER - PROVIDER TOKENS
PROVIDER:[TOKEN:[[id number]],FOLLOWUP:[2 weeks]],PROVIDER:[TOKEN:[[id number]],FOLLOWUP:[1 month]]

## 2019-04-22 DIAGNOSIS — R10.9 UNSPECIFIED ABDOMINAL PAIN: ICD-10-CM

## 2019-04-22 DIAGNOSIS — K57.32 DIVERTICULITIS OF LARGE INTESTINE WITHOUT PERFORATION OR ABSCESS WITHOUT BLEEDING: ICD-10-CM

## 2019-06-16 ENCOUNTER — EMERGENCY (EMERGENCY)
Facility: HOSPITAL | Age: 54
LOS: 0 days | Discharge: HOME | End: 2019-06-16
Attending: EMERGENCY MEDICINE
Payer: COMMERCIAL

## 2019-06-16 VITALS
OXYGEN SATURATION: 98 % | HEART RATE: 86 BPM | RESPIRATION RATE: 18 BRPM | DIASTOLIC BLOOD PRESSURE: 88 MMHG | SYSTOLIC BLOOD PRESSURE: 132 MMHG

## 2019-06-16 VITALS
SYSTOLIC BLOOD PRESSURE: 145 MMHG | RESPIRATION RATE: 20 BRPM | DIASTOLIC BLOOD PRESSURE: 96 MMHG | HEART RATE: 85 BPM | TEMPERATURE: 98 F | OXYGEN SATURATION: 97 %

## 2019-06-16 DIAGNOSIS — F10.99 ALCOHOL USE, UNSPECIFIED WITH UNSPECIFIED ALCOHOL-INDUCED DISORDER: ICD-10-CM

## 2019-06-16 DIAGNOSIS — M25.579 PAIN IN UNSPECIFIED ANKLE AND JOINTS OF UNSPECIFIED FOOT: ICD-10-CM

## 2019-06-16 DIAGNOSIS — Z79.899 OTHER LONG TERM (CURRENT) DRUG THERAPY: ICD-10-CM

## 2019-06-16 DIAGNOSIS — L03.116 CELLULITIS OF LEFT LOWER LIMB: ICD-10-CM

## 2019-06-16 PROBLEM — N20.0 CALCULUS OF KIDNEY: Chronic | Status: ACTIVE | Noted: 2019-04-16

## 2019-06-16 LAB
ALBUMIN SERPL ELPH-MCNC: 4.2 G/DL — SIGNIFICANT CHANGE UP (ref 3.5–5.2)
ALP SERPL-CCNC: 64 U/L — SIGNIFICANT CHANGE UP (ref 30–115)
ALT FLD-CCNC: 29 U/L — SIGNIFICANT CHANGE UP (ref 0–41)
ANION GAP SERPL CALC-SCNC: 12 MMOL/L — SIGNIFICANT CHANGE UP (ref 7–14)
AST SERPL-CCNC: 22 U/L — SIGNIFICANT CHANGE UP (ref 0–41)
BILIRUB SERPL-MCNC: 0.5 MG/DL — SIGNIFICANT CHANGE UP (ref 0.2–1.2)
BUN SERPL-MCNC: 12 MG/DL — SIGNIFICANT CHANGE UP (ref 10–20)
CALCIUM SERPL-MCNC: 9.7 MG/DL — SIGNIFICANT CHANGE UP (ref 8.5–10.1)
CHLORIDE SERPL-SCNC: 103 MMOL/L — SIGNIFICANT CHANGE UP (ref 98–110)
CO2 SERPL-SCNC: 25 MMOL/L — SIGNIFICANT CHANGE UP (ref 17–32)
CREAT SERPL-MCNC: 0.9 MG/DL — SIGNIFICANT CHANGE UP (ref 0.7–1.5)
GLUCOSE SERPL-MCNC: 111 MG/DL — HIGH (ref 70–99)
HCT VFR BLD CALC: 45.1 % — SIGNIFICANT CHANGE UP (ref 42–52)
HGB BLD-MCNC: 15.5 G/DL — SIGNIFICANT CHANGE UP (ref 14–18)
LACTATE SERPL-SCNC: 0.8 MMOL/L — SIGNIFICANT CHANGE UP (ref 0.5–2.2)
MCHC RBC-ENTMCNC: 30.3 PG — SIGNIFICANT CHANGE UP (ref 27–31)
MCHC RBC-ENTMCNC: 34.4 G/DL — SIGNIFICANT CHANGE UP (ref 32–37)
MCV RBC AUTO: 88.1 FL — SIGNIFICANT CHANGE UP (ref 80–94)
NRBC # BLD: 0 /100 WBCS — SIGNIFICANT CHANGE UP (ref 0–0)
PLATELET # BLD AUTO: 195 K/UL — SIGNIFICANT CHANGE UP (ref 130–400)
POTASSIUM SERPL-MCNC: 4.6 MMOL/L — SIGNIFICANT CHANGE UP (ref 3.5–5)
POTASSIUM SERPL-SCNC: 4.6 MMOL/L — SIGNIFICANT CHANGE UP (ref 3.5–5)
PROT SERPL-MCNC: 7.7 G/DL — SIGNIFICANT CHANGE UP (ref 6–8)
RBC # BLD: 5.12 M/UL — SIGNIFICANT CHANGE UP (ref 4.7–6.1)
RBC # FLD: 13.5 % — SIGNIFICANT CHANGE UP (ref 11.5–14.5)
SODIUM SERPL-SCNC: 140 MMOL/L — SIGNIFICANT CHANGE UP (ref 135–146)
WBC # BLD: 9.99 K/UL — SIGNIFICANT CHANGE UP (ref 4.8–10.8)
WBC # FLD AUTO: 9.99 K/UL — SIGNIFICANT CHANGE UP (ref 4.8–10.8)

## 2019-06-16 PROCEDURE — 93970 EXTREMITY STUDY: CPT | Mod: 26

## 2019-06-16 PROCEDURE — 73630 X-RAY EXAM OF FOOT: CPT | Mod: 26,LT

## 2019-06-16 PROCEDURE — 99284 EMERGENCY DEPT VISIT MOD MDM: CPT

## 2019-06-16 RX ORDER — AMPICILLIN SODIUM AND SULBACTAM SODIUM 250; 125 MG/ML; MG/ML
3 INJECTION, POWDER, FOR SUSPENSION INTRAMUSCULAR; INTRAVENOUS ONCE
Refills: 0 | Status: COMPLETED | OUTPATIENT
Start: 2019-06-16 | End: 2019-06-16

## 2019-06-16 RX ADMIN — AMPICILLIN SODIUM AND SULBACTAM SODIUM 3 GRAM(S): 250; 125 INJECTION, POWDER, FOR SUSPENSION INTRAMUSCULAR; INTRAVENOUS at 17:31

## 2019-06-16 RX ADMIN — AMPICILLIN SODIUM AND SULBACTAM SODIUM 200 GRAM(S): 250; 125 INJECTION, POWDER, FOR SUSPENSION INTRAMUSCULAR; INTRAVENOUS at 14:46

## 2019-06-16 NOTE — ED PROVIDER NOTE - CARE PLAN
Assessment and plan of treatment:	54m w L foot swelling and ulceration to toe w cellulitis possibly related to repetitive shoe injury. --Labs, XR sub-q air, US r/o DVT, Analgesia/antiemetics as needed, Abx, likely dc w symptomatic and supportive care, f/u PMD/Podiatry Principal Discharge DX:	Cellulitis of foot, left  Assessment and plan of treatment:	54m w L foot swelling and ulceration to toe w cellulitis possibly related to repetitive shoe injury. --Labs, XR sub-q air, US r/o DVT, Analgesia/antiemetics as needed, Abx, likely dc w symptomatic and supportive care, f/u PMD/Podiatry Principal Discharge DX:	Cellulitis of foot, left  Assessment and plan of treatment:	54m w L foot swelling and ulceration to toe w cellulitis possibly related to repetitive shoe injury. --Labs, XR r/o sub-q air, US r/o DVT, Analgesia/antiemetics as needed, Abx, likely dc w symptomatic and supportive care, f/u PMD/Podiatry

## 2019-06-16 NOTE — ED PROVIDER NOTE - OBJECTIVE STATEMENT
54m reports L foot swelling and pain x2 days w ulceration to 4th digit. Pt reports symptoms are moderate, constant, no radiating, no exacerbating/alleviating. No bleeding, no purulent discharge, no spreading/streaking redness, no numb/weak. No known trauma but patient is  and thinks that steel toed boot has been rubbing against foot. No recent travel/hosp/immobilization. No prior hx of DVT/PE. No prior hx of DM or PVD.

## 2019-06-16 NOTE — ED PROVIDER NOTE - NSFOLLOWUPCLINICS_GEN_ALL_ED_FT
A Family Medicine Doctor  Family Medicine  .  [place]   Phone:   Fax:   Follow Up Time: 1-3 Days    A Podiatrist  Podiatry  .  [place]   Phone:   Fax:   Follow Up Time: 1-3 Days    [facility]  Medicine  [address] [place] [address]   Phone: [phone]  Fax:   Follow Up Time: 1-3 Days    [facility]  Podiatry  .  [place]   Phone: [phone]  Fax:   Follow Up Time: 1-3 Days

## 2019-06-16 NOTE — ED PROVIDER NOTE - NSFOLLOWUPINSTRUCTIONS_ED_ALL_ED_FT
Return to the ER for worsening or concerning symptoms.    See printed discharge information sheets for further instructions on care for your condition.    Take Ibuprofen 600mg every 6 hours as needed for pain  Take Acetaminophen 650mg every 6 hours as needed for pain    Cellulitis, Adult  Cellulitis is a skin infection. The infected area is usually red and tender. This condition occurs most often in the arms and lower legs. The infection can travel to the muscles, blood, and underlying tissue and become serious. It is very important to get treated for this condition.    What are the causes?  Cellulitis is caused by bacteria. The bacteria enter through a break in the skin, such as a cut, burn, insect bite, open sore, or crack.    What increases the risk?  This condition is more likely to occur in people who:    Have a weak defense system (immune system).  Have open wounds on the skin such as cuts, burns, bites, and scrapes. Bacteria can enter the body through these open wounds.  Are older.  Have diabetes.  Have a type of long-lasting (chronic) liver disease (cirrhosis) or kidney disease.  Use IV drugs.    What are the signs or symptoms?  Symptoms of this condition include:    Redness, streaking, or spotting on the skin.  Swollen area of the skin.  Tenderness or pain when an area of the skin is touched.  Warm skin.  Fever.  Chills.  Blisters.    How is this diagnosed?  This condition is diagnosed based on a medical history and physical exam. You may also have tests, including:    Blood tests.  Lab tests.  Imaging tests.    How is this treated?  Treatment for this condition may include:    Medicines, such as antibiotic medicines or antihistamines.  Supportive care, such as rest and application of cold or warm cloths (cold or warm compresses) to the skin.  Hospital care, if the condition is severe.    The infection usually gets better within 1–2 days of treatment.    Follow these instructions at home:  Take over-the-counter and prescription medicines only as told by your health care provider.  If you were prescribed an antibiotic medicine, take it as told by your health care provider. Do not stop taking the antibiotic even if you start to feel better.  Drink enough fluid to keep your urine clear or pale yellow.  Do not touch or rub the infected area.  Raise (elevate) the infected area above the level of your heart while you are sitting or lying down.  Apply warm or cold compresses to the affected area as told by your health care provider.  Keep all follow-up visits as told by your health care provider. This is important. These visits let your health care provider make sure a more serious infection is not developing.  Contact a health care provider if:  You have a fever.  Your symptoms do not improve within 1–2 days of starting treatment.  Your bone or joint underneath the infected area becomes painful after the skin has healed.  Your infection returns in the same area or another area.  You notice a swollen bump in the infected area.  You develop new symptoms.  You have a general ill feeling (malaise) with muscle aches and pains.  Get help right away if:  Your symptoms get worse.  You feel very sleepy.  You develop vomiting or diarrhea that persists.  You notice red streaks coming from the infected area.  Your red area gets larger or turns dark in color.  This information is not intended to replace advice given to you by your health care provider. Make sure you discuss any questions you have with your health care provider.

## 2019-06-16 NOTE — ED PROVIDER NOTE - PHYSICAL EXAMINATION
Physical Exam  General: Awake, alert, NAD, WDWN, non-toxic appearing, NCAT  Eyes: PERRL, EOMI, no icterus, lids and conjunctivae are normal  ENT: External inspection normal, pink/moist membranes, pharynx normal  CV: S1S2, regular rate and rhythm, no murmur/gallops/rubs, no JVD, 2+ pulses b/l dp/pt, L foot edema, no cords/homans, warm/well-perfused  Respiratory: Normal respiratory rate/effort, no respiratory distress, normal voice, speaking full sentences, lungs clear to auscultation b/l, no wheezing/rales/rhonchi, no retractions, no stridor  Abdomen: Soft abdomen, no tender/distended/guarding/rebound, no CVA tender  Musculoskeletal: FROM all 4 extremities, N/V intact, L knee/tib-fib/anlke FROM, stable joints, no effusion/swelling, no anat tender/deform. L foot diffuse swelling, +blanching erythema/warmth/tender to 4th digit w 1cm ulceration to dorsal aspect (no bleeding, no purulent discharge, no lymphangitic streaking, no fluctuance/crepitus)  Neck: FROM neck, supple, no meningismus, trachea midline, no JVD  Integumentary: Color normal for race, warm and dry. L foot diffuse swelling, +blanching erythema/warmth/tender to 4th digit w 1cm ulceration to dorsal aspect (no bleeding, no purulent discharge, no lymphangitic streaking, no fluctuance/crepitus)  Neuro: Oriented x3, CN 2-12 grossly intact, normal motor, normal sensory  Psych: Oriented x3, mood normal, affect normal Physical Exam  General: Awake, alert, NAD, WDWN, non-toxic appearing, NCAT  Eyes: PERRL, EOMI, no icterus, lids and conjunctivae are normal  ENT: External inspection normal, pink/moist membranes, pharynx normal  CV: S1S2, regular rate and rhythm, no murmur/gallops/rubs, no JVD, 2+ pulses b/l dp/pt, L foot edema, no cords/homans, warm/well-perfused  Respiratory: Normal respiratory rate/effort, no respiratory distress, normal voice, speaking full sentences, lungs clear to auscultation b/l, no wheezing/rales/rhonchi, no retractions, no stridor  Abdomen: Soft abdomen, no tender/distended/guarding/rebound, no CVA tender  Musculoskeletal: FROM all 4 extremities, N/V intact, L knee/tib-fib/ankle FROM, stable joints, no effusion/swelling, no anat tender/deform. L foot diffuse swelling, +blanching erythema/warmth/tender to 4th digit w 1cm ulceration to dorsal aspect (no bleeding, no purulent discharge, no lymphangitic streaking, no fluctuance/crepitus)  Neck: FROM neck, supple, no meningismus, trachea midline, no JVD  Integumentary: Color normal for race, warm and dry. L foot diffuse swelling, +blanching erythema/warmth/tender to 4th digit w 1cm ulceration to dorsal aspect (no bleeding, no purulent discharge, no lymphangitic streaking, no fluctuance/crepitus)  Neuro: Oriented x3, CN 2-12 grossly intact, normal motor, normal sensory  Psych: Oriented x3, mood normal, affect normal

## 2019-06-16 NOTE — ED PROVIDER NOTE - NS ED ROS FT
Review of Systems  Constitutional:  No fever or chills.  Eyes:  Negative.   ENMT:  No nasal congestion, discharge, or throat pain.   Cardiac:  No chest pain, palpitations, or syncope  Respiratory:  No dyspnea, wheezing, or cough. No hemoptysis.  GI:  No vomiting, diarrhea, or abdominal pain. No melena or hematochezia.  :  No dysuria or hematuria.   Musculoskeletal:  No gait abnormality or back pain.  Skin:  L foot redness/swelling & 4th digit ulceration  Neuro:  No headache, loss of sensation, or focal weakness.  No change in mental status.

## 2019-06-16 NOTE — ED PROVIDER NOTE - CLINICAL SUMMARY MEDICAL DECISION MAKING FREE TEXT BOX
54m w L foot swelling and ulceration to toe w cellulitis possibly related to repetitive shoe injury. Labs & imaging reviewed. Abx given. Pt advised regarding symptomatic/supportive care, importance of Podiatry/PMD f/u, and symptoms to prompt ED return. Copy of results given to patient.

## 2019-06-16 NOTE — ED PROVIDER NOTE - PLAN OF CARE
54m w L foot swelling and ulceration to toe w cellulitis possibly related to repetitive shoe injury. --Labs, XR sub-q air, US r/o DVT, Analgesia/antiemetics as needed, Abx, likely dc w symptomatic and supportive care, f/u PMD/Podiatry 54m w L foot swelling and ulceration to toe w cellulitis possibly related to repetitive shoe injury. --Labs, XR r/o sub-q air, US r/o DVT, Analgesia/antiemetics as needed, Abx, likely dc w symptomatic and supportive care, f/u PMD/Podiatry

## 2020-05-28 ENCOUNTER — EMERGENCY (EMERGENCY)
Facility: HOSPITAL | Age: 55
LOS: 0 days | Discharge: HOME | End: 2020-05-28
Attending: EMERGENCY MEDICINE | Admitting: EMERGENCY MEDICINE
Payer: COMMERCIAL

## 2020-05-28 VITALS
RESPIRATION RATE: 20 BRPM | OXYGEN SATURATION: 99 % | DIASTOLIC BLOOD PRESSURE: 89 MMHG | TEMPERATURE: 100 F | HEART RATE: 80 BPM | SYSTOLIC BLOOD PRESSURE: 131 MMHG

## 2020-05-28 DIAGNOSIS — K57.92 DIVERTICULITIS OF INTESTINE, PART UNSPECIFIED, WITHOUT PERFORATION OR ABSCESS WITHOUT BLEEDING: ICD-10-CM

## 2020-05-28 DIAGNOSIS — R10.9 UNSPECIFIED ABDOMINAL PAIN: ICD-10-CM

## 2020-05-28 LAB
ALBUMIN SERPL ELPH-MCNC: 4.6 G/DL — SIGNIFICANT CHANGE UP (ref 3.5–5.2)
ALP SERPL-CCNC: 74 U/L — SIGNIFICANT CHANGE UP (ref 30–115)
ALT FLD-CCNC: 20 U/L — SIGNIFICANT CHANGE UP (ref 0–41)
ANION GAP SERPL CALC-SCNC: 11 MMOL/L — SIGNIFICANT CHANGE UP (ref 7–14)
AST SERPL-CCNC: 13 U/L — SIGNIFICANT CHANGE UP (ref 0–41)
BASOPHILS # BLD AUTO: 0.04 K/UL — SIGNIFICANT CHANGE UP (ref 0–0.2)
BASOPHILS NFR BLD AUTO: 0.3 % — SIGNIFICANT CHANGE UP (ref 0–1)
BILIRUB DIRECT SERPL-MCNC: <0.2 MG/DL — SIGNIFICANT CHANGE UP (ref 0–0.2)
BILIRUB INDIRECT FLD-MCNC: >0.2 MG/DL — SIGNIFICANT CHANGE UP (ref 0.2–1.2)
BILIRUB SERPL-MCNC: 0.4 MG/DL — SIGNIFICANT CHANGE UP (ref 0.2–1.2)
BUN SERPL-MCNC: 17 MG/DL — SIGNIFICANT CHANGE UP (ref 10–20)
CALCIUM SERPL-MCNC: 9.8 MG/DL — SIGNIFICANT CHANGE UP (ref 8.5–10.1)
CHLORIDE SERPL-SCNC: 103 MMOL/L — SIGNIFICANT CHANGE UP (ref 98–110)
CO2 SERPL-SCNC: 26 MMOL/L — SIGNIFICANT CHANGE UP (ref 17–32)
CREAT SERPL-MCNC: 1.2 MG/DL — SIGNIFICANT CHANGE UP (ref 0.7–1.5)
EOSINOPHIL # BLD AUTO: 0.13 K/UL — SIGNIFICANT CHANGE UP (ref 0–0.7)
EOSINOPHIL NFR BLD AUTO: 1 % — SIGNIFICANT CHANGE UP (ref 0–8)
GLUCOSE SERPL-MCNC: 97 MG/DL — SIGNIFICANT CHANGE UP (ref 70–99)
HCT VFR BLD CALC: 49.3 % — SIGNIFICANT CHANGE UP (ref 42–52)
HGB BLD-MCNC: 16.7 G/DL — SIGNIFICANT CHANGE UP (ref 14–18)
IMM GRANULOCYTES NFR BLD AUTO: 0.6 % — HIGH (ref 0.1–0.3)
LACTATE SERPL-SCNC: 0.6 MMOL/L — LOW (ref 0.7–2)
LIDOCAIN IGE QN: 14 U/L — SIGNIFICANT CHANGE UP (ref 7–60)
LYMPHOCYTES # BLD AUTO: 1.47 K/UL — SIGNIFICANT CHANGE UP (ref 1.2–3.4)
LYMPHOCYTES # BLD AUTO: 11.1 % — LOW (ref 20.5–51.1)
MCHC RBC-ENTMCNC: 30.1 PG — SIGNIFICANT CHANGE UP (ref 27–31)
MCHC RBC-ENTMCNC: 33.9 G/DL — SIGNIFICANT CHANGE UP (ref 32–37)
MCV RBC AUTO: 88.8 FL — SIGNIFICANT CHANGE UP (ref 80–94)
MONOCYTES # BLD AUTO: 1.07 K/UL — HIGH (ref 0.1–0.6)
MONOCYTES NFR BLD AUTO: 8.1 % — SIGNIFICANT CHANGE UP (ref 1.7–9.3)
NEUTROPHILS # BLD AUTO: 10.4 K/UL — HIGH (ref 1.4–6.5)
NEUTROPHILS NFR BLD AUTO: 78.9 % — HIGH (ref 42.2–75.2)
NRBC # BLD: 0 /100 WBCS — SIGNIFICANT CHANGE UP (ref 0–0)
PLATELET # BLD AUTO: 262 K/UL — SIGNIFICANT CHANGE UP (ref 130–400)
POTASSIUM SERPL-MCNC: 4.7 MMOL/L — SIGNIFICANT CHANGE UP (ref 3.5–5)
POTASSIUM SERPL-SCNC: 4.7 MMOL/L — SIGNIFICANT CHANGE UP (ref 3.5–5)
PROT SERPL-MCNC: 8.1 G/DL — HIGH (ref 6–8)
RBC # BLD: 5.55 M/UL — SIGNIFICANT CHANGE UP (ref 4.7–6.1)
RBC # FLD: 12.8 % — SIGNIFICANT CHANGE UP (ref 11.5–14.5)
SODIUM SERPL-SCNC: 140 MMOL/L — SIGNIFICANT CHANGE UP (ref 135–146)
WBC # BLD: 13.19 K/UL — HIGH (ref 4.8–10.8)
WBC # FLD AUTO: 13.19 K/UL — HIGH (ref 4.8–10.8)

## 2020-05-28 PROCEDURE — 74177 CT ABD & PELVIS W/CONTRAST: CPT | Mod: 26

## 2020-05-28 PROCEDURE — 99285 EMERGENCY DEPT VISIT HI MDM: CPT

## 2020-05-28 RX ORDER — METRONIDAZOLE 500 MG
1 TABLET ORAL
Qty: 30 | Refills: 0
Start: 2020-05-28 | End: 2020-06-06

## 2020-05-28 RX ORDER — MOXIFLOXACIN HYDROCHLORIDE TABLETS, 400 MG 400 MG/1
1 TABLET, FILM COATED ORAL
Qty: 20 | Refills: 0
Start: 2020-05-28 | End: 2020-06-06

## 2020-05-28 RX ORDER — SODIUM CHLORIDE 9 MG/ML
1000 INJECTION INTRAMUSCULAR; INTRAVENOUS; SUBCUTANEOUS ONCE
Refills: 0 | Status: COMPLETED | OUTPATIENT
Start: 2020-05-28 | End: 2020-05-28

## 2020-05-28 RX ADMIN — SODIUM CHLORIDE 1000 MILLILITER(S): 9 INJECTION INTRAMUSCULAR; INTRAVENOUS; SUBCUTANEOUS at 17:45

## 2020-05-28 NOTE — ED PROVIDER NOTE - ATTENDING CONTRIBUTION TO CARE
54yo M with PMHx diverticulitis presents for LLQ abd pain since last night, constant, nonradiating, no modifying factors. Pt notes having constipation for 1 month, started on laxatives/stool softeners by GI (Dr. Rodriguez) 2 weeks ago. +loose stools since starting laxatives. Pt states had diverticulitis with phlegmon 1 year ago. No h/o abd surgeries. Last colonoscopy 1 year ago, normal. Denies fever/chills, headache, lightheadedness, CP, SOB, cough, palpitations, nausea, vomiting, dysuria.    Vital signs reviewed  GENERAL: Patient nontoxic appearing, NAD  HEAD: NCAT  EYES: Anicteric  ENT: MMM  RESPIRATORY: Normal respiratory effort. CTA B/L. No wheezing, rales, rhonchi  CARDIOVASCULAR: Regular rate and rhythm  ABDOMEN: Soft. Nondistended. Mild-moderate LLQ TTP. No guarding or rebound. No CVA tenderness.  MUSCULOSKELETAL/EXTREMITIES: Brisk cap refill. Equal radial pulses.   SKIN:  Warm and dry  NEURO: AAOx3. No gross FND.

## 2020-05-28 NOTE — ED PROVIDER NOTE - PHYSICAL EXAMINATION
Physical Exam    Vital Signs: I have reviewed the initial vital signs.  Constitutional: well-nourished, appears stated age, no acute distress  Eyes: Conjunctiva pink, Sclera clear  Cardiovascular: S1 and S2, regular rate, regular rhythm, well-perfused extremities, radial pulses equal and 2+  Respiratory: unlabored respiratory effort, clear to auscultation bilaterally no wheezing, rales and rhonchi  Gastrointestinal: soft, mild llq ttp without guarding or rebound, no pulsatile mass, normal bowl sounds  Musculoskeletal: supple neck, no lower extremity edema, no midline tenderness  Integumentary: warm, dry, no rash  Neurologic: awake, alert, nvi

## 2020-05-28 NOTE — ED PROVIDER NOTE - CLINICAL SUMMARY MEDICAL DECISION MAKING FREE TEXT BOX
54yo M with PMHx diverticulitis presents for LLQ abd pain. CT showing diverticulitis without abscess or perforation. Pt appears well, stable vitals, mild leukocytosis, neg lactate. Plan for PO ABx as outpatient and f/u with his GI doctor.

## 2020-05-28 NOTE — ED PROVIDER NOTE - CARE PROVIDER_API CALL
Franko Rodriguez  Gastroenterology  [address]  [place] [address]  Phone: [phone]  Fax: [phone]  Follow Up Time: 1-3 Days

## 2020-05-28 NOTE — ED PROVIDER NOTE - OBJECTIVE STATEMENT
54 yo male, pmh of chronic constipation, diverticulitis, presents to ed for llq pain, started yesterday, mild, aching, no radiation. Pt admits to chronic constipation better with otc meds, recent colonoscopy was one year ago and normal. denies fever, chills, cp, sob, nvd, back pain, testicular pain, dysuria, and hematuria.

## 2020-05-28 NOTE — ED PROVIDER NOTE - PATIENT PORTAL LINK FT
You can access the FollowMyHealth Patient Portal offered by [facility] by registering at the following website: [email or web address]. By joining [facility]’s FollowMyHealth portal, you will also be able to view your health information using other applications (apps) compatible with our system.

## 2023-02-21 ENCOUNTER — OUTPATIENT (OUTPATIENT)
Dept: OUTPATIENT SERVICES | Facility: HOSPITAL | Age: 58
LOS: 1 days | End: 2023-02-21
Payer: COMMERCIAL

## 2023-02-21 DIAGNOSIS — R10.9 UNSPECIFIED ABDOMINAL PAIN: ICD-10-CM

## 2023-02-21 DIAGNOSIS — Z00.8 ENCOUNTER FOR OTHER GENERAL EXAMINATION: ICD-10-CM

## 2023-02-21 PROCEDURE — 76700 US EXAM ABDOM COMPLETE: CPT | Mod: 26

## 2023-02-21 PROCEDURE — 76700 US EXAM ABDOM COMPLETE: CPT

## 2023-02-22 DIAGNOSIS — R10.9 UNSPECIFIED ABDOMINAL PAIN: ICD-10-CM

## 2023-02-27 PROBLEM — Z00.00 ENCOUNTER FOR PREVENTIVE HEALTH EXAMINATION: Status: ACTIVE | Noted: 2023-02-27
